# Patient Record
Sex: MALE | NOT HISPANIC OR LATINO | Employment: FULL TIME | URBAN - METROPOLITAN AREA
[De-identification: names, ages, dates, MRNs, and addresses within clinical notes are randomized per-mention and may not be internally consistent; named-entity substitution may affect disease eponyms.]

---

## 2019-11-13 ENCOUNTER — HOSPITAL ENCOUNTER (EMERGENCY)
Facility: CLINIC | Age: 49
Discharge: HOME | End: 2019-11-13
Attending: EMERGENCY MEDICINE | Admitting: EMERGENCY MEDICINE
Payer: COMMERCIAL

## 2019-11-13 ENCOUNTER — APPOINTMENT (OUTPATIENT)
Dept: CT IMAGING | Facility: CLINIC | Age: 49
End: 2019-11-13
Attending: EMERGENCY MEDICINE
Payer: COMMERCIAL

## 2019-11-13 VITALS
RESPIRATION RATE: 18 BRPM | DIASTOLIC BLOOD PRESSURE: 93 MMHG | SYSTOLIC BLOOD PRESSURE: 132 MMHG | WEIGHT: 228 LBS | TEMPERATURE: 98 F | HEART RATE: 56 BPM | OXYGEN SATURATION: 96 %

## 2019-11-13 DIAGNOSIS — R51.9 NONINTRACTABLE HEADACHE, UNSPECIFIED CHRONICITY PATTERN, UNSPECIFIED HEADACHE TYPE: ICD-10-CM

## 2019-11-13 DIAGNOSIS — R03.0 ELEVATED BLOOD PRESSURE READING WITHOUT DIAGNOSIS OF HYPERTENSION: ICD-10-CM

## 2019-11-13 LAB
ANION GAP SERPL CALCULATED.3IONS-SCNC: 5 MMOL/L (ref 3–14)
BUN SERPL-MCNC: 15 MG/DL (ref 7–30)
CALCIUM SERPL-MCNC: 8.7 MG/DL (ref 8.5–10.1)
CHLORIDE SERPL-SCNC: 108 MMOL/L (ref 94–109)
CO2 SERPL-SCNC: 25 MMOL/L (ref 20–32)
CREAT SERPL-MCNC: 0.94 MG/DL (ref 0.66–1.25)
ERYTHROCYTE [DISTWIDTH] IN BLOOD BY AUTOMATED COUNT: 12.7 % (ref 10–15)
GFR SERPL CREATININE-BSD FRML MDRD: >90 ML/MIN/{1.73_M2}
GLUCOSE SERPL-MCNC: 117 MG/DL (ref 70–99)
HCT VFR BLD AUTO: 47.8 % (ref 40–53)
HGB BLD-MCNC: 15.4 G/DL (ref 13.3–17.7)
INTERPRETATION ECG - MUSE: NORMAL
MCH RBC QN AUTO: 30.7 PG (ref 26.5–33)
MCHC RBC AUTO-ENTMCNC: 32.2 G/DL (ref 31.5–36.5)
MCV RBC AUTO: 95 FL (ref 78–100)
PLATELET # BLD AUTO: 258 10E9/L (ref 150–450)
POTASSIUM SERPL-SCNC: 3.7 MMOL/L (ref 3.4–5.3)
RBC # BLD AUTO: 5.02 10E12/L (ref 4.4–5.9)
SODIUM SERPL-SCNC: 138 MMOL/L (ref 133–144)
WBC # BLD AUTO: 10.9 10E9/L (ref 4–11)

## 2019-11-13 PROCEDURE — 70450 CT HEAD/BRAIN W/O DYE: CPT

## 2019-11-13 PROCEDURE — 96375 TX/PRO/DX INJ NEW DRUG ADDON: CPT

## 2019-11-13 PROCEDURE — 96374 THER/PROPH/DIAG INJ IV PUSH: CPT

## 2019-11-13 PROCEDURE — 85027 COMPLETE CBC AUTOMATED: CPT | Performed by: EMERGENCY MEDICINE

## 2019-11-13 PROCEDURE — 25000128 H RX IP 250 OP 636: Performed by: EMERGENCY MEDICINE

## 2019-11-13 PROCEDURE — 96376 TX/PRO/DX INJ SAME DRUG ADON: CPT

## 2019-11-13 PROCEDURE — 80048 BASIC METABOLIC PNL TOTAL CA: CPT | Performed by: EMERGENCY MEDICINE

## 2019-11-13 PROCEDURE — 93005 ELECTROCARDIOGRAM TRACING: CPT

## 2019-11-13 PROCEDURE — 99285 EMERGENCY DEPT VISIT HI MDM: CPT | Mod: 25

## 2019-11-13 RX ORDER — METOCLOPRAMIDE 10 MG/1
10 TABLET ORAL 4 TIMES DAILY PRN
Qty: 15 TABLET | Refills: 0 | Status: SHIPPED | OUTPATIENT
Start: 2019-11-13 | End: 2019-11-23

## 2019-11-13 RX ORDER — DIPHENHYDRAMINE HYDROCHLORIDE 50 MG/ML
25 INJECTION INTRAMUSCULAR; INTRAVENOUS ONCE
Status: COMPLETED | OUTPATIENT
Start: 2019-11-13 | End: 2019-11-13

## 2019-11-13 RX ORDER — METOCLOPRAMIDE HYDROCHLORIDE 5 MG/ML
10 INJECTION INTRAMUSCULAR; INTRAVENOUS ONCE
Status: COMPLETED | OUTPATIENT
Start: 2019-11-13 | End: 2019-11-13

## 2019-11-13 RX ADMIN — PROCHLORPERAZINE EDISYLATE 10 MG: 5 INJECTION, SOLUTION INTRAMUSCULAR; INTRAVENOUS at 05:28

## 2019-11-13 RX ADMIN — DIPHENHYDRAMINE HYDROCHLORIDE 25 MG: 50 INJECTION, SOLUTION INTRAMUSCULAR; INTRAVENOUS at 04:48

## 2019-11-13 RX ADMIN — METOCLOPRAMIDE 10 MG: 5 INJECTION, SOLUTION INTRAMUSCULAR; INTRAVENOUS at 04:48

## 2019-11-13 RX ADMIN — DIPHENHYDRAMINE HYDROCHLORIDE 25 MG: 50 INJECTION, SOLUTION INTRAMUSCULAR; INTRAVENOUS at 05:28

## 2019-11-13 ASSESSMENT — ENCOUNTER SYMPTOMS
NUMBNESS: 0
FEVER: 0

## 2019-11-13 NOTE — ED PROVIDER NOTES
History     Chief Complaint:  Hypertension    The history is provided by the patient. A  was used (daughter provided as needed).      Les Coto is a 49 year old male who presents to the emergency department today for evaluation of hypertension. The patient reports that approximately an hour ago he took his blood pressure and noted it was elevated at 167. Since then he has started to feel ill. He endorses a headache that starts in his back and radiates up to his head. He states that he has been getting these headaches regularly for the past year or so; he has not been evaluated for them yet. The patient reports he took an over the counter pain medication prior to arrival which did not provide relief. He adds that yesterday he got back from a work trip to [place] after driving for nine days. Prior to going to bed last night he ate his normal dinner and notes he took a shot of whiskey to help him sleep. He has had no fevers, numbness, tingling, or any vision changes. The patient denies any drug use, smoking, alcohol use, or regularly having energy drinks or caffeine.     Allergies:  No Known Drug Allergies     Medications:    Medications reviewed. No pertinent medications.     Past Medical History:    Kidney stone, h/o    Past Surgical History:    Surgical history reviewed. No pertinent surgical history.    Family History:    Family history reviewed. No pertinent family history.    Social History:  The patient was accompanied to the ED by his daughter.  Marital Status:     Drug Use: Negative  Alcohol Use: Negative  Smoking Status: Negative     Review of Systems   Constitutional: Negative for fever.   Eyes: Negative for visual disturbance.   Cardiovascular:        Hypertension    Neurological: Negative for numbness.        No tingling   All other systems reviewed and are negative.      Physical Exam     Patient Vitals for the past 24 hrs:   BP Temp Temp src Pulse Heart Rate Resp  SpO2 Weight   11/13/19 0520 (!) 136/98 -- -- 70 -- 18 97 % --   11/13/19 0424 (!) 163/118 98  F (36.7  C) Temporal 66 66 18 99 % 103.4 kg (228 lb)      Physical Exam    GENERAL:  Pleasant, well appearing male  HEENT:   External ears are normal.     Temporal arteries are non-tender.      Oropharynx is moist, without lesions or trismus.  Eyes:   PERRL.  EOMI.      No corneal clouding.   NECK:   Supple, no meningismus.       Negative Brudzinski's sign.  CV:    Regular rate and rhythm.    No murmurs, rubs or gallops.  PULM:   Clear to auscultation bilateral.      No respiratory distress.      No stridor or wheezing.  ABD:  Soft, non-tender, non-distended.      No pulsatile masses.      No rebound or guarding.  MSK:    No gross deformity to all four extremities.      No significant joint effusions.  LYMPH:  No cervical lymphadenopathy.  NEURO:  A & O x 3    CN II-XII intact, speech is clear with no aphasia.      No pronator drift.      Strength is 5/5 in all 4 extremities.  Sensation is intact.      Normal muscular tone, no tremor.  SKIN:   Warm, dry and intact.    PSYCH:   Mood is good and affect is appropriate.      Emergency Department Course     ECG:  ECG taken at 0430, ECG read at 0432  Sinus bradycardia  Minimal voltage criteria for LVH, may be normal variant  Borderline ECG   Rate 59 bpm. MA interval 154. QRS duration 98. QT/QTc 436/431. P-R-T axes 32 -4 14.     Imaging:  Radiology findings were communicated with the patient and family who voiced understanding of the findings.  CT, Head without Contrast  1.  Normal head CT.  Reading per radiology    Laboratory:  Laboratory findings were communicated with the patient and family who voiced understanding of the findings.  CBC: AWNL (WBC 10.9, HGB 15.4, )  BMP: Glucose 117 (H). O/w WNL (Creatinine 0.94)      Interventions:  0448 Diphenhydramine 25 mg IV  0448 Metoclopramide 10 mg IV  0528 Diphenhydramine 25 mg IV  0528 Prochlorperazine 10 mg IV    Emergency  Department Course:  0428 Nursing notes and vitals reviewed.  0430 An ECG was performed, results above.   0431 I performed an exam of the patient as documented above.   0436 IV was inserted and blood was drawn for laboratory testing, results above.   0516 The patient was sent for a CT scan while in the emergency department, results above.    0520 Per RN, the patient's headache has not resolved with Reglan. Plan to try Compazine.  0546 Patient was rechecked and updated with laboratory and imaging results.    Findings and plan explained to the Patient and daughter. Patient discharged home with instructions regarding supportive care, medications, and reasons to return. The importance of close follow-up was reviewed. The patient was prescribed Reglan.     I personally reviewed the laboratory and imaging results with the Patient and daughter and answered all related questions prior to discharge.      Impression & Plan      Medical Decision Makin-year-old male presented to the ED with progressively worsening headache.  This headache has been intermittent for last year and a half with no prior imaging.  Head CT undertaken to ensure there was no intracranial mass. CT normal.  No alternative pathology noted on CT scan.  He has no features concerning for subarachnoid hemorrhage, meningitis, dural sinus thrombosis, temporal arteritis or glaucoma.  Findings are most consistent with mixed type headache with migraine and tension features.  Patient had near complete resolution of headache with interventions as described above.    Patient was also worried about elevated blood pressure.  Blood pressure was elevated while patient presented in pain.  Blood pressure remarkably improved with analgesics alone and no antihypertensives given.  No evidence of acute endorgan damage.  Patient to follow-up with primary care physician for blood pressure recheck.  Safe for discharge home.        Diagnosis:    ICD-10-CM    1.  Nonintractable headache, unspecified chronicity pattern, unspecified headache type R51    2. Elevated blood pressure reading without diagnosis of hypertension R03.0        Disposition:  The patient is discharged to home.     Discharge Medications:  New Prescriptions    METOCLOPRAMIDE (REGLAN) 10 MG TABLET    Take 1 tablet (10 mg) by mouth 4 times daily as needed (headache or nausea)       Scribe Disclosure:  Airam SELF, am serving as a scribe at 4:31 AM on 11/13/2019 to document services personally performed by Miller Crow MD based on my observations and the provider's statements to me.    11/13/2019   [facility] EMERGENCY DEPARTMENT       Miller Crow MD  11/13/19 4363

## 2019-11-13 NOTE — DISCHARGE INSTRUCTIONS
Please make an appointment to follow up with your primary care provider in 5-7 days even if entirely better for blood pressure recheck.     Discharge Instructions  Headache    You were seen today for a headache. Headaches may be caused by many different things such as muscle tension, sinus inflammation, anxiety and stress, having too little sleep, too much alcohol, some medical conditions or injury. You may have a migraine, which is caused by changes in the blood vessels in your head.  At this time your provider does not find that your headache is a sign of anything dangerous or life-threatening.  However, sometimes the signs of serious illness do not show up right away.      Generally, every Emergency Department visit should have a follow-up clinic visit with either a primary or a specialty clinic/provider. Please follow-up as instructed by your emergency provider today.    Return to the Emergency Department if:  You get a new fever of 100.4 F or higher.  Your headache gets much worse.  You get a stiff neck with your headache.  You get a new headache that is significantly different or worse than headaches you have had before.  You are vomiting (throwing up) and cannot keep food or water down.  You have blurry or double vision or other problems with your eyes.  You have a new weakness on one side of your body.  You have difficulty with balance which is new.  You or your family thinks you are confused.  You have a seizure.    What can I do to help myself?  Pain medications - You may take a pain medication such as Tylenol  (acetaminophen), Advil , Motrin  (ibuprofen) or Aleve  (naproxen).  Take a pain reliever as soon as you notice symptoms.  Starting medications as soon as you start to have symptoms may lessen the amount of pain you have.  Relaxing in a quiet, dark room may help.  Get enough sleep and eat meals regularly.  You may need to watch for certain foods or other things which may trigger your headaches.   Keeping a journal of your headaches and possible triggers may help you and your primary provider to identify things which you should avoid which may be causing your headaches.  If you were given a prescription for medicine here today, be sure to read all of the information (including the package insert) that comes with your prescription.  This will include important information about the medicine, its side effects, and any warnings that you need to know about.  The pharmacist who fills the prescription can provide more information and answer questions you may have about the medicine.  If you have questions or concerns that the pharmacist cannot address, please call or return to the Emergency Department.   Remember that you can always come back to the Emergency Department if you are not able to see your regular provider in the amount of time listed above, if you get any new symptoms, or if there is anything that worries you.  Discharge Instructions  Hypertension - High Blood Pressure    During you visit to the Emergency Department, your blood pressure was higher than the recommended blood pressure.  This may be related to stress, pain, medication or other temporary conditions. In these cases, your blood pressure may return to normal on its own. If you have a history of high blood pressure, you may need to have your provider adjust your medications. Sometimes, your high measurement here may indicate that you have developed high blood pressure that will stay high unless it is treated. As a general rule, high blood pressure causes problems over years rather than days, weeks, or months. So, while it is important to treat blood pressure, it is rarely important to treat blood pressure immediately. Occasionally we will begin a medication in the Emergency Department; more often we will recommend close follow-up for medications with a primary doctor/clinic.    Generally, every Emergency Department visit should have a  follow-up clinic visit with either a primary or a specialty clinic/provider. Please follow-up as instructed by your emergency provider today.    Return to the Emergency Department if you start to have:  A severe headache.  Chest pain.  Shortness of breath.  Weakness or numbness that affects one part of the body.  Confusion.  Vision changes.  Significant swelling of legs and/or eyes.  A reaction to any medication started in the Emergency Department.    What can I do to help myself?  Avoid alcohol.  Take any blood pressure medicine that you are prescribed.  Get a good night s sleep.  Lower your salt intake.  Exercise.  Lose weight.  Manage stress.  See your doctor regularly    If blood pressure medication was started in the Emergency Department:  The medicine may not have an immediate effect. The body and brain determine what blood pressure you have. The medicine s job is to retrain the body s  thermostat  to a lower blood pressure.  You will need to follow up with your provider to see how this medicine is working for you.  If you were given a prescription for medicine here today, be sure to read all of the information (including the package insert) that comes with your prescription.  This will include important information about the medicine, its side effects, and any warnings that you need to know about.  The pharmacist who fills the prescription can provide more information and answer questions you may have about the medicine.  If you have questions or concerns that the pharmacist cannot address, please call or return to the Emergency Department.   Remember that you can always come back to the Emergency Department if you are not able to see your regular provider in the amount of time listed above, if you get any new symptoms, or if there is anything that worries you.

## 2019-11-13 NOTE — ED AVS SNAPSHOT
[facility] Emergency Department  [address]  [facility] [address]  Phone:  [phone]  Fax:  [phone]                                    Les Coto   MRN: [id number]    Department:  [facility] Emergency Department   Date of Visit:  11/13/2019           After Visit Summary Signature Page    I have received my discharge instructions, and my questions have been answered. I have discussed any challenges I see with this plan with the nurse or doctor.    ..........................................................................................................................................  Patient/Patient Representative Signature      ..........................................................................................................................................  Patient Representative Print Name and Relationship to Patient    ..................................................               ................................................  Date                                   Time    ..........................................................................................................................................  Reviewed by Signature/Title    ...................................................              ..............................................  Date                                               Time          22EPIC Rev 08/18

## 2019-11-23 ENCOUNTER — HOSPITAL ENCOUNTER (EMERGENCY)
Facility: CLINIC | Age: 49
Discharge: HOME | End: 2019-11-23
Attending: EMERGENCY MEDICINE | Admitting: EMERGENCY MEDICINE
Payer: COMMERCIAL

## 2019-11-23 VITALS
BODY MASS INDEX: 29.68 KG/M2 | RESPIRATION RATE: 20 BRPM | SYSTOLIC BLOOD PRESSURE: 122 MMHG | OXYGEN SATURATION: 96 % | TEMPERATURE: 97.6 F | WEIGHT: 231.26 LBS | HEIGHT: 74 IN | HEART RATE: 64 BPM | DIASTOLIC BLOOD PRESSURE: 75 MMHG

## 2019-11-23 DIAGNOSIS — R51.9 ACUTE NONINTRACTABLE HEADACHE, UNSPECIFIED HEADACHE TYPE: ICD-10-CM

## 2019-11-23 PROCEDURE — 96375 TX/PRO/DX INJ NEW DRUG ADDON: CPT

## 2019-11-23 PROCEDURE — 25000128 H RX IP 250 OP 636: Performed by: EMERGENCY MEDICINE

## 2019-11-23 PROCEDURE — 64405 NJX AA&/STRD GR OCPL NRV: CPT

## 2019-11-23 PROCEDURE — 96365 THER/PROPH/DIAG IV INF INIT: CPT

## 2019-11-23 PROCEDURE — 99284 EMERGENCY DEPT VISIT MOD MDM: CPT | Mod: 25

## 2019-11-23 RX ORDER — METOCLOPRAMIDE HYDROCHLORIDE 5 MG/ML
10 INJECTION INTRAMUSCULAR; INTRAVENOUS ONCE
Status: COMPLETED | OUTPATIENT
Start: 2019-11-23 | End: 2019-11-23

## 2019-11-23 RX ORDER — MAGNESIUM SULFATE 1 G/100ML
1 INJECTION INTRAVENOUS ONCE
Status: COMPLETED | OUTPATIENT
Start: 2019-11-23 | End: 2019-11-23

## 2019-11-23 RX ORDER — DIPHENHYDRAMINE HYDROCHLORIDE 50 MG/ML
12.5 INJECTION INTRAMUSCULAR; INTRAVENOUS ONCE
Status: COMPLETED | OUTPATIENT
Start: 2019-11-23 | End: 2019-11-23

## 2019-11-23 RX ORDER — DEXAMETHASONE SODIUM PHOSPHATE 10 MG/ML
10 INJECTION, SOLUTION INTRAMUSCULAR; INTRAVENOUS ONCE
Status: COMPLETED | OUTPATIENT
Start: 2019-11-23 | End: 2019-11-23

## 2019-11-23 RX ORDER — LIDOCAINE HYDROCHLORIDE AND EPINEPHRINE 10; 10 MG/ML; UG/ML
INJECTION, SOLUTION INFILTRATION; PERINEURAL
Status: DISCONTINUED
Start: 2019-11-23 | End: 2019-11-23 | Stop reason: HOSPADM

## 2019-11-23 RX ORDER — KETOROLAC TROMETHAMINE 15 MG/ML
15 INJECTION, SOLUTION INTRAMUSCULAR; INTRAVENOUS ONCE
Status: COMPLETED | OUTPATIENT
Start: 2019-11-23 | End: 2019-11-23

## 2019-11-23 RX ADMIN — KETOROLAC TROMETHAMINE 15 MG: 15 INJECTION, SOLUTION INTRAMUSCULAR; INTRAVENOUS at 04:47

## 2019-11-23 RX ADMIN — MAGNESIUM SULFATE HEPTAHYDRATE 1 G: 1 INJECTION, SOLUTION INTRAVENOUS at 06:12

## 2019-11-23 RX ADMIN — DEXAMETHASONE SODIUM PHOSPHATE 10 MG: 10 INJECTION, SOLUTION INTRAMUSCULAR; INTRAVENOUS at 04:51

## 2019-11-23 RX ADMIN — METOCLOPRAMIDE 10 MG: 5 INJECTION, SOLUTION INTRAMUSCULAR; INTRAVENOUS at 06:11

## 2019-11-23 RX ADMIN — DIPHENHYDRAMINE HYDROCHLORIDE 12 MG: 50 INJECTION, SOLUTION INTRAMUSCULAR; INTRAVENOUS at 06:08

## 2019-11-23 ASSESSMENT — MIFFLIN-ST. JEOR: SCORE: 1983.75

## 2019-11-23 NOTE — ED AVS SNAPSHOT
[facility] Emergency Department  [address]  [facility] [address]  Phone:  [phone]  Fax:  [phone]                                    Les Coto   MRN: [id number]    Department:  [facility] Emergency Department   Date of Visit:  11/23/2019           After Visit Summary Signature Page    I have received my discharge instructions, and my questions have been answered. I have discussed any challenges I see with this plan with the nurse or doctor.    ..........................................................................................................................................  Patient/Patient Representative Signature      ..........................................................................................................................................  Patient Representative Print Name and Relationship to Patient    ..................................................               ................................................  Date                                   Time    ..........................................................................................................................................  Reviewed by Signature/Title    ...................................................              ..............................................  Date                                               Time          22EPIC Rev 08/18

## 2019-11-23 NOTE — ED TRIAGE NOTES
"Pt has headache which started at 0300.  He had headache last week and scan was done and he received medications.   He is nauseated with \"\"warm\" feeling down abdomen.  "

## 2019-11-23 NOTE — ED PROVIDER NOTES
History     Chief Complaint:  Headache    The history is provided by the patient and a relative.      Les Coto is a 49 year old male, who has been experiencing headaches regularly for the past year, but not yet evaluated for this, who presents with his daughter for evaluation of a headache that began at 0300 this morning. Of note, patient was evaluated for similar symptoms on 11/13 and had a CT scan of the head, as noted below and discharged home feeling improved after interventions at that time. Patient states that he began to experience a headache at 0300 this morning, similar to the headache that prompted his presentation at his last ED visit. Patient took an over-the-counter medication 15 minutes prior to arrival, but due to pain, patient was prompted to present.     Here, patient also endorses that his blood pressure is elevated. He states that his headache begins at the base of his neck, radiating into the right side of his head and across the forehead, which has been consistent with his prior headaches. He denies any fever, recent head injury or trauma.    Imaging from ED Visit 11/13/19:  CT, Head without Contrast  1.  Normal head CT.  Reading per radiology    Allergies:  No Known Drug Allergies     Medications:    The patient is not currently taking any prescribed medications.     Past Medical History:    Kidney stone    Past Surgical History:    History reviewed. No pertinent past surgical history.     Family History:    History reviewed. No pertinent family history.      Social History:  The patient was accompanied to the ED by daughter.  Smoking Status: No  Alcohol Use: No  Drug Use: No   Marital Status:       Review of Systems   All other systems reviewed and are negative.      Physical Exam     Patient Vitals for the past 24 hrs:   BP Temp Temp src Pulse Heart Rate Resp SpO2 Height Weight   11/23/19 0630 122/75 -- -- 64 -- -- -- -- --   11/23/19 0620 (!) 135/90 -- -- -- -- -- -- -- --  "  11/23/19 0610 (!) 134/91 -- -- 65 -- -- -- -- --   11/23/19 0353 (!) 141/106 97.6  F (36.4  C) Temporal -- 71 20 96 % 1.88 m (6' 2\") 104.9 kg (231 lb 4.2 oz)       Physical Exam  Nursing note and vitals reviewed.  Constitutional: Cooperative. Uncomfortable appearing.   HENT: No neck rigidity.   Mouth/Throat: Mucous membranes are normal.   Cardiovascular: Normal rate, regular rhythm and normal heart sounds.  No murmur.  Pulmonary/Chest: Effort normal and breath sounds normal. No respiratory distress. No wheezes. No rales.   Abdominal: Soft. Normal appearance. There is no tenderness.  Neurological: Alert. Oriented x4.  Stregnth normal.   Skin: Skin is warm and dry. No rash noted.   Psychiatric: Normal mood and affect.      Emergency Department Course     Procedures:    Procedure: Right Occipital nerve block  Indication: Headache  Performed by: Dr. South Arreaga MD  Consent: Patient gave verbal consent after risks of intravascular injection, cellulitis and treatment failure was explained to the patient.  Technique: The location was cleansed using alcohol swabs.  Landmarks identified with occipital foramen identified by palpation.  A 50:50 mix of 0.5% bupivacaine with epinephrine and 1% Lidocaine with epinephrine, total 8 ml's, was injected.  The area was then massaged.  Complications: none    Interventions:  0447 Toradol 15 mg IV  0451 Decadron 10 mg IV  0608 Benadryl 12.5 mg IV  0611 Reglan 10 mg IV  0612 Magnesium 1 gm IV    Emergency Department Course:  Past medical records, nursing notes, and vitals reviewed.    (7294)   I performed an exam of the patient as documented above. History obtained from patient.    (0500)   Performed the above occipital nerve block, as noted in procedures.     (2237)   Updated by RN that patient is sleeping soundly.     (6556)   Updated by RN that patient woke up and is endorsing a lot of pain.     (2557)   Rechecked patient. Will order additional interventions.     (9639)   I rechecked " the patient. He endorses resolution of his headache. Discussed plan of care and patient will be discharged.     Findings and plan explained to the Patient and daughter. Patient discharged home with instructions regarding supportive care, medications, and reasons to return. The importance of close follow-up was reviewed. I personally answered all related questions prior to discharge.     Impression & Plan   Medical Decision Making:  Mr. Coto is a 49 year old gentleman with several visits recently for headache. He has known cervical spine disease. Head scan reviewed and was negative from several says ago. After the above interventions, he is pain free. No findings concerning for meninginitis or infectious cause. There's been no trauma. This is not a story concerning for dural venous thrombosis or subarachnoid hemorrhage. I suspect combination migrainous pain with possible tension headache or occipital lobe inflammation. He has excellent follow up scheduled with his family doc and neurology on Monday and Tuesday. Return precautions discussed.     Discharge Diagnosis:    ICD-10-CM    1. Acute nonintractable headache, unspecified headache type R51        Disposition:  Discharged to home.    Scribe Disclosure:  Lorin SELF, am serving as a scribe at 4:35 AM on 11/23/2019 to document services personally performed by South Arreaga MD based on my observations and the provider's statements to me.   11/23/2019   [facility] EMERGENCY DEPARTMENT       South Arreaga MD  11/23/19 1346

## 2020-07-27 ENCOUNTER — APPOINTMENT (OUTPATIENT)
Dept: ULTRASOUND IMAGING | Facility: CLINIC | Age: 50
End: 2020-07-27
Attending: EMERGENCY MEDICINE
Payer: COMMERCIAL

## 2020-07-27 ENCOUNTER — HOSPITAL ENCOUNTER (EMERGENCY)
Facility: CLINIC | Age: 50
Discharge: HOME | End: 2020-07-27
Attending: EMERGENCY MEDICINE | Admitting: EMERGENCY MEDICINE
Payer: COMMERCIAL

## 2020-07-27 VITALS
SYSTOLIC BLOOD PRESSURE: 141 MMHG | OXYGEN SATURATION: 98 % | RESPIRATION RATE: 16 BRPM | HEART RATE: 82 BPM | TEMPERATURE: 97.9 F | DIASTOLIC BLOOD PRESSURE: 86 MMHG

## 2020-07-27 DIAGNOSIS — Z20.822 SUSPECTED COVID-19 VIRUS INFECTION: ICD-10-CM

## 2020-07-27 DIAGNOSIS — M54.42 ACUTE BILATERAL LOW BACK PAIN WITH LEFT-SIDED SCIATICA: ICD-10-CM

## 2020-07-27 DIAGNOSIS — M79.89 LEFT LEG SWELLING: ICD-10-CM

## 2020-07-27 LAB
ALBUMIN UR-MCNC: NEGATIVE MG/DL
ANION GAP SERPL CALCULATED.3IONS-SCNC: 6 MMOL/L (ref 3–14)
APPEARANCE UR: CLEAR
BILIRUB UR QL STRIP: NEGATIVE
BUN SERPL-MCNC: 11 MG/DL (ref 7–30)
CALCIUM SERPL-MCNC: 8.6 MG/DL (ref 8.5–10.1)
CHLORIDE SERPL-SCNC: 104 MMOL/L (ref 94–109)
CO2 SERPL-SCNC: 27 MMOL/L (ref 20–32)
COLOR UR AUTO: YELLOW
CREAT SERPL-MCNC: 1.05 MG/DL (ref 0.66–1.25)
GFR SERPL CREATININE-BSD FRML MDRD: 82 ML/MIN/{1.73_M2}
GLUCOSE SERPL-MCNC: 105 MG/DL (ref 70–99)
GLUCOSE UR STRIP-MCNC: NEGATIVE MG/DL
HGB UR QL STRIP: NEGATIVE
KETONES UR STRIP-MCNC: NEGATIVE MG/DL
LEUKOCYTE ESTERASE UR QL STRIP: NEGATIVE
MUCOUS THREADS #/AREA URNS LPF: PRESENT /LPF
NITRATE UR QL: NEGATIVE
PH UR STRIP: 5.5 PH (ref 5–7)
POTASSIUM SERPL-SCNC: 4.1 MMOL/L (ref 3.4–5.3)
RBC #/AREA URNS AUTO: <1 /HPF (ref 0–2)
SODIUM SERPL-SCNC: 137 MMOL/L (ref 133–144)
SOURCE: ABNORMAL
SP GR UR STRIP: 1.02 (ref 1–1.03)
UROBILINOGEN UR STRIP-MCNC: NORMAL MG/DL (ref 0–2)
WBC #/AREA URNS AUTO: 1 /HPF (ref 0–5)

## 2020-07-27 PROCEDURE — 93971 EXTREMITY STUDY: CPT | Mod: LT

## 2020-07-27 PROCEDURE — 81001 URINALYSIS AUTO W/SCOPE: CPT | Performed by: EMERGENCY MEDICINE

## 2020-07-27 PROCEDURE — C9803 HOPD COVID-19 SPEC COLLECT: HCPCS

## 2020-07-27 PROCEDURE — 99285 EMERGENCY DEPT VISIT HI MDM: CPT | Mod: 25

## 2020-07-27 PROCEDURE — 80048 BASIC METABOLIC PNL TOTAL CA: CPT | Performed by: EMERGENCY MEDICINE

## 2020-07-27 PROCEDURE — U0003 INFECTIOUS AGENT DETECTION BY NUCLEIC ACID (DNA OR RNA); SEVERE ACUTE RESPIRATORY SYNDROME CORONAVIRUS 2 (SARS-COV-2) (CORONAVIRUS DISEASE [COVID-19]), AMPLIFIED PROBE TECHNIQUE, MAKING USE OF HIGH THROUGHPUT TECHNOLOGIES AS DESCRIBED BY CMS-2020-01-R: HCPCS | Performed by: EMERGENCY MEDICINE

## 2020-07-27 ASSESSMENT — ENCOUNTER SYMPTOMS
ROS GI COMMENTS: + BLACK STOOL
COUGH: 1
FEVER: 1
DYSURIA: 0
BACK PAIN: 1
SHORTNESS OF BREATH: 0
HEMATURIA: 0

## 2020-07-27 NOTE — ED PROVIDER NOTES
History   Chief Complaint:  Fever; Wants COVID19 test; and Flank Pain    HPI   Les Coto is a 50 year old male with history of nephrolithiasis, H. Pylori infection, chronic diarrhea, hypertension, and hyperlipidemia who presents for evaluation of left-sided back pain, associated with a subjective fever, and left leg swelling, that worsened 4 days ago. The patient states for the past 3 months he has had intermittent left-sided back pain, which radiates down his left leg. He also endorses left leg swelling. About 4 days ago he began having a subjective feel at night, so he opened the window to help cool him off. Over the past four days, he has also had increased left leg swelling, noting the swelling alleviates at night and increases throughout the day. This morning he woke up feeling hot again, so he increased the AC prior to calling his primary care and presenting to urgent care, who subsequently prompted his presentation.    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 He also endorses a mild cough as well as black stool. He denies any shortness of breath, dysuria, hematuria, or other symptoms prompting his presentation.     Laboratory Results 7/27/2020  CBC: AWNL (WBC 6.0, HGB 16.1, )     Allergies:  No Known Drug Allergies     Medications:   Inderal  Norvasc  Prilosec     Past Medical History:    Nephrolithiasis   GERD  H. Pylori infection  Chronic diarrhea   Hypertension  Hyperlipidemia   Anxiety     Past Surgical History:    Abdomen surgery, for volvulus    Appendectomy    Family History:    The patient denies any relevant family medical history.     Social History:  The patient was unaccompanied to the ED.  Smoking Status: Never Smoker  Smokeless Tobacco: Never Used  Alcohol Use: Yes  Drug Use: No  PCP: [facility], [place]     Review of Systems   Constitutional: Positive for fever (Subjective).   Respiratory:  Positive for cough. Negative for shortness of breath.    Cardiovascular: Positive for leg swelling (left).   Gastrointestinal:        + Black stool   Genitourinary: Negative for dysuria and hematuria.   Musculoskeletal: Positive for back pain.   All other systems reviewed and are negative.      Physical Exam     Patient Vitals for the past 24 hrs:   BP Temp Temp src Pulse Heart Rate Resp SpO2   07/27/20 1437 (!) 141/86 -- -- -- 88 16 96 %   07/27/20 1228 (!) 150/94 97.9  F (36.6  C) Oral 82 -- -- 99 %     Physical Exam  Constitutional: Alert, attentive  HENT:    Nose: Nose normal.    Mouth/Throat: Oropharynx is clear, mucous membranes are moist  Eyes: EOM are normal.    CV: brisk capillary refill to the distal extremities, 2+ DP pulses bilaterally.  Chest: Effort normal and breath sounds normal.   GI: No distension. There is no tenderness to the RUQ, RLQ or LLQ. No Junior's sign or McBurney's point tenderness. No palpable, pulsatile mass.  MSK: Normal range of motion. No midline T/L/S spine tenderness. No LE edema  Neurological: Alert, attentive.  GCS 15; 5/5 strength throughout the bilateral lower extremities (hip flexion/extension, knee flexion/extension, DF/PF, EHL/FHL); sensation intact to light touch throughout L2-S1 distributions to the lower extremities; 2+ DTRs to the bilateral lower extremities (patellar, achilles); normal gait  Skin: Skin is warm and dry.     Emergency Department Course   Imaging:  Radiology findings were communicated with the patient who voiced understanding of the findings.    US Lower Extremity Venous Duplex Left  IMPRESSION:   No DVT demonstrated.  Reading per radiology.      Laboratory:  Laboratory findings were communicated with the patient who voiced understanding of the findings.    BMP: Glucose 105 (H) o/w WNL (Creatinine 1.05)     UA with Microscopic: Mucous Present (A) o/w WNL     COVID-19 Virus (Coronavirus) by PCR: Pending.      Emergency Department Course:  Past medical  records, nursing notes, and vitals reviewed.  The patient was sent for a US Lower Extremity Venous Duplex Left while in the emergency department, results above.    IV was inserted and blood was drawn for laboratory testing, results above.   The patient provided a urine sample here in the emergency department. This was sent for laboratory testing, findings above.   A nasal swab was obtained for laboratory testing, findings above.      (1255)   I performed an exam of the patient as documented above. History obtained from patient.     (1500)   I rechecked the patient and discussed results and plan of care.     Findings and plan explained to the Patient. Patient discharged home with instructions regarding supportive care, medications, and reasons to return. The importance of close follow-up was reviewed. I personally reviewed the laboratory and imaging results with the Patient and answered all related questions prior to discharge.     Impression & Plan   Covid-19  Les Coto was evaluated during a global COVID-19 pandemic, which necessitated consideration that the patient might be at risk for infection with the SARS-CoV-2 virus that causes COVID-19.   Applicable protocols for evaluation were followed during the patient's care.   COVID-19 was considered as part of the patient's evaluation. The plan for testing is:  a test was obtained during this visit.     Medical Decision Making:  This is a 50-year-old male who presents for evaluation of several concerns:  1.  Subjective fevers and chills: Symptoms are overall concerning for viral syndrome, possibly COVID-19.  He has no known contacts but has symptoms consistent with potential diagnosis of the same.  COVID-19 testing is pending.  No cardiopulmonary symptoms to suggest ACS, PE, other more concerning etiology.  Plan home isolation until results are available.  2.  Left leg swelling: Patient notes this is subacute, proximally lasting 3 months, waxing and waning while  driving.  Specifically, symptoms resolved after he is done driving and elevates the foot.  There is no erythema or warmth to suggest cellulitis, and DVT ultrasound is negative.  Recommended outpatient follow-up and repeat ultrasound if symptoms persist after 1 week.  3.  Low back pain: The patient indicates history of kidney stones and several months of low back pain as well, sometimes rating to the left leg.  UA is unremarkable for evidence of hematuria suggest nephrolithiasis and the patient has no significant pain at this time.  No evidence of cauda equina.  Normal exam otherwise.  Plan continue supportive cares with standing.  Patient be discharged with strict return precautions for worse pain, weakness, or any other concerns.    Diagnosis:    ICD-10-CM    1. Suspected COVID-19 virus infection  Z20.828    2. Left leg swelling  M79.89    3. Acute bilateral low back pain with left-sided sciatica  M54.42      Disposition:  Discharged to home.     Scribe Disclosure:  I, Naren Benjamin, am serving as a scribe at 12:39 PM on 7/27/2020 to document services personally performed by South Escudero MD based on my observations and the provider's statements to me.  July 27, 2020   [facility] EMERGENCY DEPARTMENT        South Escudero MD  07/27/20 7376

## 2020-07-27 NOTE — ED AVS SNAPSHOT
[facility] Emergency Department  [address]  [facility] [address]  Phone:  [phone]  Fax:  [phone]                                    Les Coto   MRN: [id number]    Department:  [facility] Emergency Department   Date of Visit:  7/27/2020           After Visit Summary Signature Page    I have received my discharge instructions, and my questions have been answered. I have discussed any challenges I see with this plan with the nurse or doctor.    ..........................................................................................................................................  Patient/Patient Representative Signature      ..........................................................................................................................................  Patient Representative Print Name and Relationship to Patient    ..................................................               ................................................  Date                                   Time    ..........................................................................................................................................  Reviewed by Signature/Title    ...................................................              ..............................................  Date                                               Time          22EPIC Rev 08/18

## 2020-07-27 NOTE — ED TRIAGE NOTES
Patient presents with left flank pain going down left leg.  Left leg swelling. Issue started 3 months ago.  Patient reports fever x 4 days. Has not taken anything for fever. ABCs intact.     Requesting COVID19 test

## 2020-07-28 LAB
SARS-COV-2 RNA SPEC QL NAA+PROBE: ABNORMAL
SPECIMEN SOURCE: ABNORMAL

## 2020-07-29 ENCOUNTER — HOSPITAL ENCOUNTER (EMERGENCY)
Facility: CLINIC | Age: 50
Discharge: HOME | End: 2020-07-29
Attending: EMERGENCY MEDICINE | Admitting: EMERGENCY MEDICINE
Payer: COMMERCIAL

## 2020-07-29 ENCOUNTER — APPOINTMENT (OUTPATIENT)
Dept: CT IMAGING | Facility: CLINIC | Age: 50
End: 2020-07-29
Attending: EMERGENCY MEDICINE
Payer: COMMERCIAL

## 2020-07-29 VITALS
DIASTOLIC BLOOD PRESSURE: 97 MMHG | HEART RATE: 70 BPM | TEMPERATURE: 98.3 F | OXYGEN SATURATION: 92 % | RESPIRATION RATE: 22 BRPM | SYSTOLIC BLOOD PRESSURE: 133 MMHG

## 2020-07-29 DIAGNOSIS — R51.9 NONINTRACTABLE HEADACHE, UNSPECIFIED CHRONICITY PATTERN, UNSPECIFIED HEADACHE TYPE: ICD-10-CM

## 2020-07-29 DIAGNOSIS — U07.1 2019 NOVEL CORONAVIRUS DISEASE (COVID-19): ICD-10-CM

## 2020-07-29 LAB
ANION GAP SERPL CALCULATED.3IONS-SCNC: 8 MMOL/L (ref 3–14)
BASOPHILS # BLD AUTO: 0 10E9/L (ref 0–0.2)
BASOPHILS NFR BLD AUTO: 0.3 %
BUN SERPL-MCNC: 16 MG/DL (ref 7–30)
CALCIUM SERPL-MCNC: 8.6 MG/DL (ref 8.5–10.1)
CHLORIDE SERPL-SCNC: 106 MMOL/L (ref 94–109)
CO2 SERPL-SCNC: 22 MMOL/L (ref 20–32)
CREAT SERPL-MCNC: 0.98 MG/DL (ref 0.66–1.25)
DIFFERENTIAL METHOD BLD: NORMAL
EOSINOPHIL # BLD AUTO: 0 10E9/L (ref 0–0.7)
EOSINOPHIL NFR BLD AUTO: 0.2 %
ERYTHROCYTE [DISTWIDTH] IN BLOOD BY AUTOMATED COUNT: 13 % (ref 10–15)
GFR SERPL CREATININE-BSD FRML MDRD: 89 ML/MIN/{1.73_M2}
GLUCOSE SERPL-MCNC: 110 MG/DL (ref 70–99)
HCT VFR BLD AUTO: 49.8 % (ref 40–53)
HGB BLD-MCNC: 16 G/DL (ref 13.3–17.7)
IMM GRANULOCYTES # BLD: 0 10E9/L (ref 0–0.4)
IMM GRANULOCYTES NFR BLD: 0.3 %
LYMPHOCYTES # BLD AUTO: 1.7 10E9/L (ref 0.8–5.3)
LYMPHOCYTES NFR BLD AUTO: 29.1 %
MCH RBC QN AUTO: 29.6 PG (ref 26.5–33)
MCHC RBC AUTO-ENTMCNC: 32.1 G/DL (ref 31.5–36.5)
MCV RBC AUTO: 92 FL (ref 78–100)
MONOCYTES # BLD AUTO: 0.6 10E9/L (ref 0–1.3)
MONOCYTES NFR BLD AUTO: 9.5 %
NEUTROPHILS # BLD AUTO: 3.5 10E9/L (ref 1.6–8.3)
NEUTROPHILS NFR BLD AUTO: 60.6 %
NRBC # BLD AUTO: 0 10*3/UL
NRBC BLD AUTO-RTO: 0 /100
PLATELET # BLD AUTO: 154 10E9/L (ref 150–450)
POTASSIUM SERPL-SCNC: 3.8 MMOL/L (ref 3.4–5.3)
RBC # BLD AUTO: 5.41 10E12/L (ref 4.4–5.9)
SODIUM SERPL-SCNC: 136 MMOL/L (ref 133–144)
WBC # BLD AUTO: 5.8 10E9/L (ref 4–11)

## 2020-07-29 PROCEDURE — 99284 EMERGENCY DEPT VISIT MOD MDM: CPT | Mod: 25

## 2020-07-29 PROCEDURE — 96374 THER/PROPH/DIAG INJ IV PUSH: CPT

## 2020-07-29 PROCEDURE — 96375 TX/PRO/DX INJ NEW DRUG ADDON: CPT

## 2020-07-29 PROCEDURE — 96361 HYDRATE IV INFUSION ADD-ON: CPT

## 2020-07-29 PROCEDURE — 96376 TX/PRO/DX INJ SAME DRUG ADON: CPT

## 2020-07-29 PROCEDURE — 85025 COMPLETE CBC W/AUTO DIFF WBC: CPT | Performed by: EMERGENCY MEDICINE

## 2020-07-29 PROCEDURE — 25800030 ZZH RX IP 258 OP 636: Performed by: EMERGENCY MEDICINE

## 2020-07-29 PROCEDURE — 25000128 H RX IP 250 OP 636: Performed by: EMERGENCY MEDICINE

## 2020-07-29 PROCEDURE — 80048 BASIC METABOLIC PNL TOTAL CA: CPT | Performed by: EMERGENCY MEDICINE

## 2020-07-29 PROCEDURE — 70450 CT HEAD/BRAIN W/O DYE: CPT

## 2020-07-29 RX ORDER — KETOROLAC TROMETHAMINE 15 MG/ML
15 INJECTION, SOLUTION INTRAMUSCULAR; INTRAVENOUS ONCE
Status: COMPLETED | OUTPATIENT
Start: 2020-07-29 | End: 2020-07-29

## 2020-07-29 RX ORDER — SODIUM CHLORIDE 9 MG/ML
INJECTION, SOLUTION INTRAVENOUS CONTINUOUS
Status: DISCONTINUED | OUTPATIENT
Start: 2020-07-29 | End: 2020-07-29 | Stop reason: HOSPADM

## 2020-07-29 RX ORDER — DIPHENHYDRAMINE HYDROCHLORIDE 50 MG/ML
25 INJECTION INTRAMUSCULAR; INTRAVENOUS ONCE
Status: COMPLETED | OUTPATIENT
Start: 2020-07-29 | End: 2020-07-29

## 2020-07-29 RX ADMIN — PROCHLORPERAZINE EDISYLATE 5 MG: 5 INJECTION INTRAMUSCULAR; INTRAVENOUS at 12:57

## 2020-07-29 RX ADMIN — PROCHLORPERAZINE EDISYLATE 5 MG: 5 INJECTION INTRAMUSCULAR; INTRAVENOUS at 11:10

## 2020-07-29 RX ADMIN — KETOROLAC TROMETHAMINE 15 MG: 15 INJECTION, SOLUTION INTRAMUSCULAR; INTRAVENOUS at 10:29

## 2020-07-29 RX ADMIN — DIPHENHYDRAMINE HYDROCHLORIDE 25 MG: 50 INJECTION, SOLUTION INTRAMUSCULAR; INTRAVENOUS at 11:10

## 2020-07-29 RX ADMIN — SODIUM CHLORIDE 1000 ML: 9 INJECTION, SOLUTION INTRAVENOUS at 10:28

## 2020-07-29 ASSESSMENT — ENCOUNTER SYMPTOMS
HEADACHES: 1
FEVER: 1

## 2020-07-29 NOTE — ED NOTES
Pt understands that compazine and benadryl may make him drowsy and verbalized that he could obtain a ride home.

## 2020-07-29 NOTE — ED TRIAGE NOTES
Patient presents with cough, headache specifically that got worse. Patient tested positive for COVID yesterday, seen on 7/27. ABCDs intact, alert and oriented x 4.

## 2020-07-29 NOTE — ED PROVIDER NOTES
History     Chief Complaint:  Headache and Cough    HPI  Les Coto is a 50 year old male with a history of HTN and KAMILLE who presents for evaluation of fever, headache and cough.  Patient has been sick for about 4 days.  He has had high fevers.  He is a  and returned recently.  He was tested for COVID and was told it was positive.  He reports a headache since yesterday.  He is taking Tylenol with no improvement of his headache.  He is eating and drinking.  Denies any nausea or vomiting.  Reports a cough and shortness of breath.  He reports he coughs and his headache gets much worse.  Is otherwise healthy.      Allergies:  No known drug allergies    Medications:    Zantac  Naproxen  Prilosec  Amlodipine  Propranolol    Past Medical History:    Kidney stone  HTN  Hypertriglyceridemia  Plantar fasciitis  Fatty liver  H. Pylori infection  KAMILLE  Gastrointestinal ulcer  Gallstones  GERD    Past Surgical History:    Appendectomy  Abdomen surgery    Family History:    History reviewed. No pertinent family history.     Social History:  The patient arrives alone  Smoking: never  Alcohol use: yes  PCP: [facility], [place]  Marital Status: Single [1]      Review of Systems   Constitutional: Positive for fever.   Neurological: Positive for headaches.   All other systems reviewed and are negative.      Physical Exam     Patient Vitals for the past 24 hrs:   BP Temp Temp src Pulse Heart Rate Resp SpO2   07/29/20 1245 (!) 158/89 -- -- 74 -- -- 93 %   07/29/20 1230 (!) 158/111 -- -- 76 -- -- 92 %   07/29/20 1215 (!) 163/102 -- -- 80 -- -- 94 %   07/29/20 1200 (!) 144/103 -- -- 65 -- -- 95 %   07/29/20 1145 (!) 146/100 -- -- 71 -- -- 95 %   07/29/20 1115 (!) 138/103 -- -- 81 -- -- 96 %   07/29/20 1100 (!) 142/96 -- -- -- -- -- 97 %   07/29/20 0917 138/87 98.3  F (36.8  C) Oral -- 106 22 100 %     Physical Exam  General: Patient is alert and interactive when I enter the room  Head:  The scalp, face, and head  appear normal  Eyes:  Conjunctivae are normal  ENT:    The nose is normal    Pinnae are normal    External acoustic canals are normal  Neck:  Trachea midline, good ROM, no rigidity   CV:  Pulses are normal,RRR.    Resp:  No respiratory distress, CTAB  Abdomen:      Soft, non-tender, non-distended  Musc:  Normal muscular tone    No major joint effusions    No asymmetric leg swelling  Skin:  No rash or lesions noted  Neuro:  Speech is normal and fluent. Face is symmetric.     Moving all extremities well.   Psych: Awake. Alert.  Normal affect.  Appropriate interactions.      Emergency Department Course     Imaging:  Radiology findings were communicated with the patient who voiced understanding of the findings.    CT Head wo contrast   IMPRESSION: No acute pathology, no bleed, mass, or areas of acute   infarction. No change  Reading per radiology    Laboratory:  Laboratory findings were communicated with the patient who voiced understanding of the findings.    CBC: (WBC 5.8, HGB 16.0, )   BMP: Glucose 110 (H), o/w WNL (Creatinine: 0.98)    Interventions:  1028 NS 1L IV Bolus  1029 Toradol, 15 mg, IV  1110 Compazine 10 mg IV  1110 Benadryl 25 mg IV    Emergency Department Course:  Past medical records, nursing notes, and vitals reviewed.  0933: I performed an exam of the patient and obtained history, as documented above.     IV was inserted and blood was drawn for laboratory testing, results above.    The patient was sent for a CT while in the emergency department, findings above    I personally reviewed the laboratory and imaging results with the Patient and answered all related questions prior to discharge.     Findings and plan explained to the Patient. Patient discharged home with instructions regarding supportive care, medications, and reasons to return. The importance of close follow-up was reviewed.   Impression & Plan      Medical Decision Making:  Les Coto is a 50 year old male who presents to the  emergency department today for evaluation of headache and cough.  Patient was recently diagnosed with COVID.  He he is continued to have fevers headache and cough.  Patient was given Compazine Toradol and fluids and felt much improved.  We did do a head CT which was unremarkable.  The rest of his blood work was unremarkable.  Patient otherwise looked well and I think he is just dealing with symptoms from COVID.  He is not hypoxic so I do not think he needs to be admitted.  He otherwise appears well with no respiratory distress so I think he can continue to treat this at home.  Return precautions however given.        Diagnosis:    ICD-10-CM   1. 2019 novel coronavirus disease (COVID-19)  U07.1   2. Nonintractable headache, unspecified chronicity pattern, unspecified headache type  R51       Disposition:   Discharged to home.      Scribe Disclosure:  I, Reva Bradford, am serving as a scribe at 9:33 AM on 7/29/2020 to document services personally performed by Elvi Smart MD based on my observations and the provider's statements to me.    [facility] EMERGENCY DEPARTMENT     Elvi Smart MD  07/29/20 2251

## 2020-07-29 NOTE — ED AVS SNAPSHOT
[facility] Emergency Department  [address]  [facility] [address]  Phone:  [phone]  Fax:  [phone]                                    Les Coto   MRN: [id number]    Department:  [facility] Emergency Department   Date of Visit:  7/29/2020           After Visit Summary Signature Page    I have received my discharge instructions, and my questions have been answered. I have discussed any challenges I see with this plan with the nurse or doctor.    ..........................................................................................................................................  Patient/Patient Representative Signature      ..........................................................................................................................................  Patient Representative Print Name and Relationship to Patient    ..................................................               ................................................  Date                                   Time    ..........................................................................................................................................  Reviewed by Signature/Title    ...................................................              ..............................................  Date                                               Time          22EPIC Rev 08/18

## 2020-12-13 ENCOUNTER — HEALTH MAINTENANCE LETTER (OUTPATIENT)
Age: 50
End: 2020-12-13

## 2021-08-10 ENCOUNTER — HOSPITAL ENCOUNTER (EMERGENCY)
Facility: CLINIC | Age: 51
Discharge: HOME | End: 2021-08-10
Attending: EMERGENCY MEDICINE | Admitting: EMERGENCY MEDICINE
Payer: COMMERCIAL

## 2021-08-10 VITALS
HEART RATE: 56 BPM | HEIGHT: 72 IN | TEMPERATURE: 97.4 F | WEIGHT: 225 LBS | BODY MASS INDEX: 30.48 KG/M2 | DIASTOLIC BLOOD PRESSURE: 94 MMHG | OXYGEN SATURATION: 93 % | RESPIRATION RATE: 18 BRPM | SYSTOLIC BLOOD PRESSURE: 126 MMHG

## 2021-08-10 DIAGNOSIS — M54.50 ACUTE EXACERBATION OF CHRONIC LOW BACK PAIN: ICD-10-CM

## 2021-08-10 DIAGNOSIS — G89.29 ACUTE EXACERBATION OF CHRONIC LOW BACK PAIN: ICD-10-CM

## 2021-08-10 PROCEDURE — 96375 TX/PRO/DX INJ NEW DRUG ADDON: CPT

## 2021-08-10 PROCEDURE — 96374 THER/PROPH/DIAG INJ IV PUSH: CPT

## 2021-08-10 PROCEDURE — 99285 EMERGENCY DEPT VISIT HI MDM: CPT | Mod: 25

## 2021-08-10 PROCEDURE — 250N000011 HC RX IP 250 OP 636: Performed by: EMERGENCY MEDICINE

## 2021-08-10 RX ORDER — DIAZEPAM 10 MG/2ML
5 INJECTION, SOLUTION INTRAMUSCULAR; INTRAVENOUS ONCE
Status: COMPLETED | OUTPATIENT
Start: 2021-08-10 | End: 2021-08-10

## 2021-08-10 RX ORDER — HYDROMORPHONE HYDROCHLORIDE 1 MG/ML
0.5 INJECTION, SOLUTION INTRAMUSCULAR; INTRAVENOUS; SUBCUTANEOUS
Status: DISCONTINUED | OUTPATIENT
Start: 2021-08-10 | End: 2021-08-10 | Stop reason: HOSPADM

## 2021-08-10 RX ORDER — KETOROLAC TROMETHAMINE 15 MG/ML
15 INJECTION, SOLUTION INTRAMUSCULAR; INTRAVENOUS ONCE
Status: COMPLETED | OUTPATIENT
Start: 2021-08-10 | End: 2021-08-10

## 2021-08-10 RX ADMIN — HYDROMORPHONE HYDROCHLORIDE 0.5 MG: 1 INJECTION, SOLUTION INTRAMUSCULAR; INTRAVENOUS; SUBCUTANEOUS at 17:50

## 2021-08-10 RX ADMIN — DIAZEPAM 5 MG: 5 INJECTION, SOLUTION INTRAMUSCULAR; INTRAVENOUS at 17:43

## 2021-08-10 RX ADMIN — KETOROLAC TROMETHAMINE 15 MG: 15 INJECTION, SOLUTION INTRAMUSCULAR; INTRAVENOUS at 17:38

## 2021-08-10 ASSESSMENT — ENCOUNTER SYMPTOMS
NUMBNESS: 0
BACK PAIN: 1
WEAKNESS: 0

## 2021-08-10 ASSESSMENT — MIFFLIN-ST. JEOR: SCORE: 1913.59

## 2021-08-10 NOTE — ED TRIAGE NOTES
Pt presents for concern of back pain. Pt states it started x1 week ago after receiving a vaccine and then starting his  route. States he was seen at an ED else were and given pain medications then. States that his back has continued to be painful and he states he is now having a difficulty time walking. Denies bowel or bladder control issues. States hx of herniated disks. ABC intact, A&Ox4.
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
## 2021-08-10 NOTE — ED NOTES
Pt. States 0/10 on pain scale. Pt. Able to move around in bed without pain. Pt. Want to try and walk.

## 2021-08-11 ASSESSMENT — ENCOUNTER SYMPTOMS: DIFFICULTY URINATING: 0

## 2021-09-26 ENCOUNTER — HEALTH MAINTENANCE LETTER (OUTPATIENT)
Age: 51
End: 2021-09-26

## 2022-01-16 ENCOUNTER — HEALTH MAINTENANCE LETTER (OUTPATIENT)
Age: 52
End: 2022-01-16

## 2023-01-14 ENCOUNTER — HEALTH MAINTENANCE LETTER (OUTPATIENT)
Age: 53
End: 2023-01-14

## 2023-04-23 ENCOUNTER — HEALTH MAINTENANCE LETTER (OUTPATIENT)
Age: 53
End: 2023-04-23

## 2024-06-30 ENCOUNTER — HEALTH MAINTENANCE LETTER (OUTPATIENT)
Age: 54
End: 2024-06-30

## 2025-05-05 ENCOUNTER — APPOINTMENT (OUTPATIENT)
Dept: MRI IMAGING | Facility: CLINIC | Age: 55
End: 2025-05-05
Attending: EMERGENCY MEDICINE
Payer: COMMERCIAL

## 2025-05-05 ENCOUNTER — HOSPITAL ENCOUNTER (EMERGENCY)
Facility: CLINIC | Age: 55
Discharge: HOME | End: 2025-05-06
Attending: EMERGENCY MEDICINE | Admitting: EMERGENCY MEDICINE
Payer: COMMERCIAL

## 2025-05-05 DIAGNOSIS — M48.061 FORAMINAL STENOSIS OF LUMBAR REGION: ICD-10-CM

## 2025-05-05 DIAGNOSIS — M48.061 SPINAL STENOSIS AT L4-L5 LEVEL: ICD-10-CM

## 2025-05-05 DIAGNOSIS — M54.16 LUMBAR BACK PAIN WITH RADICULOPATHY AFFECTING LEFT LOWER EXTREMITY: ICD-10-CM

## 2025-05-05 DIAGNOSIS — T81.49XA POSTOPERATIVE WOUND ABSCESS: ICD-10-CM

## 2025-05-05 LAB
ANION GAP SERPL CALCULATED.3IONS-SCNC: 10 MMOL/L (ref 7–15)
BASOPHILS # BLD AUTO: 0 10E3/UL (ref 0–0.2)
BASOPHILS NFR BLD AUTO: 0 %
BUN SERPL-MCNC: 12.7 MG/DL (ref 6–20)
CALCIUM SERPL-MCNC: 9.5 MG/DL (ref 8.8–10.4)
CHLORIDE SERPL-SCNC: 104 MMOL/L (ref 98–107)
CREAT SERPL-MCNC: 1.05 MG/DL (ref 0.67–1.17)
CRP SERPL-MCNC: 26.07 MG/L
EGFRCR SERPLBLD CKD-EPI 2021: 84 ML/MIN/1.73M2
EOSINOPHIL # BLD AUTO: 0.4 10E3/UL (ref 0–0.7)
EOSINOPHIL NFR BLD AUTO: 4 %
ERYTHROCYTE [DISTWIDTH] IN BLOOD BY AUTOMATED COUNT: 12.4 % (ref 10–15)
ERYTHROCYTE [SEDIMENTATION RATE] IN BLOOD BY WESTERGREN METHOD: 21 MM/HR (ref 0–20)
GLUCOSE SERPL-MCNC: 104 MG/DL (ref 70–99)
HCO3 SERPL-SCNC: 23 MMOL/L (ref 22–29)
HCT VFR BLD AUTO: 42.7 % (ref 40–53)
HGB BLD-MCNC: 13.8 G/DL (ref 13.3–17.7)
IMM GRANULOCYTES # BLD: 0 10E3/UL
IMM GRANULOCYTES NFR BLD: 0 %
LYMPHOCYTES # BLD AUTO: 3.3 10E3/UL (ref 0.8–5.3)
LYMPHOCYTES NFR BLD AUTO: 35 %
MCH RBC QN AUTO: 29.7 PG (ref 26.5–33)
MCHC RBC AUTO-ENTMCNC: 32.3 G/DL (ref 31.5–36.5)
MCV RBC AUTO: 92 FL (ref 78–100)
MONOCYTES # BLD AUTO: 1.1 10E3/UL (ref 0–1.3)
MONOCYTES NFR BLD AUTO: 12 %
NEUTROPHILS # BLD AUTO: 4.6 10E3/UL (ref 1.6–8.3)
NEUTROPHILS NFR BLD AUTO: 49 %
NRBC # BLD AUTO: 0 10E3/UL
NRBC BLD AUTO-RTO: 0 /100
PLATELET # BLD AUTO: 221 10E3/UL (ref 150–450)
POTASSIUM SERPL-SCNC: 4.8 MMOL/L (ref 3.4–5.3)
RBC # BLD AUTO: 4.64 10E6/UL (ref 4.4–5.9)
SODIUM SERPL-SCNC: 137 MMOL/L (ref 135–145)
WBC # BLD AUTO: 9.5 10E3/UL (ref 4–11)

## 2025-05-05 PROCEDURE — 72158 MRI LUMBAR SPINE W/O & W/DYE: CPT

## 2025-05-05 PROCEDURE — 250N000011 HC RX IP 250 OP 636: Mod: JZ | Performed by: EMERGENCY MEDICINE

## 2025-05-05 PROCEDURE — 36415 COLL VENOUS BLD VENIPUNCTURE: CPT | Performed by: EMERGENCY MEDICINE

## 2025-05-05 PROCEDURE — 99285 EMERGENCY DEPT VISIT HI MDM: CPT | Mod: 25

## 2025-05-05 PROCEDURE — 85652 RBC SED RATE AUTOMATED: CPT | Performed by: EMERGENCY MEDICINE

## 2025-05-05 PROCEDURE — 86140 C-REACTIVE PROTEIN: CPT | Performed by: EMERGENCY MEDICINE

## 2025-05-05 PROCEDURE — 96376 TX/PRO/DX INJ SAME DRUG ADON: CPT | Mod: 59

## 2025-05-05 PROCEDURE — A9585 GADOBUTROL INJECTION: HCPCS | Performed by: EMERGENCY MEDICINE

## 2025-05-05 PROCEDURE — 96374 THER/PROPH/DIAG INJ IV PUSH: CPT | Mod: 59

## 2025-05-05 PROCEDURE — 85025 COMPLETE CBC W/AUTO DIFF WBC: CPT | Performed by: EMERGENCY MEDICINE

## 2025-05-05 PROCEDURE — 250N000013 HC RX MED GY IP 250 OP 250 PS 637: Performed by: EMERGENCY MEDICINE

## 2025-05-05 PROCEDURE — 255N000002 HC RX 255 OP 636: Performed by: EMERGENCY MEDICINE

## 2025-05-05 PROCEDURE — 80048 BASIC METABOLIC PNL TOTAL CA: CPT | Performed by: EMERGENCY MEDICINE

## 2025-05-05 RX ORDER — GADOBUTROL 604.72 MG/ML
10 INJECTION INTRAVENOUS ONCE
Status: COMPLETED | OUTPATIENT
Start: 2025-05-05 | End: 2025-05-05

## 2025-05-05 RX ORDER — CYCLOBENZAPRINE HCL 10 MG
10 TABLET ORAL ONCE
Status: COMPLETED | OUTPATIENT
Start: 2025-05-05 | End: 2025-05-05

## 2025-05-05 RX ADMIN — CYCLOBENZAPRINE 10 MG: 10 TABLET, FILM COATED ORAL at 23:39

## 2025-05-05 RX ADMIN — HYDROMORPHONE HYDROCHLORIDE 1 MG: 1 INJECTION, SOLUTION INTRAMUSCULAR; INTRAVENOUS; SUBCUTANEOUS at 21:37

## 2025-05-05 RX ADMIN — HYDROMORPHONE HYDROCHLORIDE 1 MG: 1 INJECTION, SOLUTION INTRAMUSCULAR; INTRAVENOUS; SUBCUTANEOUS at 23:40

## 2025-05-05 RX ADMIN — GADOBUTROL 10 ML: 604.72 INJECTION INTRAVENOUS at 22:34

## 2025-05-05 ASSESSMENT — COLUMBIA-SUICIDE SEVERITY RATING SCALE - C-SSRS
2. HAVE YOU ACTUALLY HAD ANY THOUGHTS OF KILLING YOURSELF IN THE PAST MONTH?: NO
6. HAVE YOU EVER DONE ANYTHING, STARTED TO DO ANYTHING, OR PREPARED TO DO ANYTHING TO END YOUR LIFE?: NO
1. IN THE PAST MONTH, HAVE YOU WISHED YOU WERE DEAD OR WISHED YOU COULD GO TO SLEEP AND NOT WAKE UP?: NO

## 2025-05-05 ASSESSMENT — ACTIVITIES OF DAILY LIVING (ADL)
ADLS_ACUITY_SCORE: 41

## 2025-05-06 VITALS
DIASTOLIC BLOOD PRESSURE: 97 MMHG | TEMPERATURE: 98.9 F | OXYGEN SATURATION: 98 % | SYSTOLIC BLOOD PRESSURE: 130 MMHG | HEART RATE: 62 BPM | RESPIRATION RATE: 18 BRPM

## 2025-05-06 PROCEDURE — 96376 TX/PRO/DX INJ SAME DRUG ADON: CPT

## 2025-05-06 PROCEDURE — 250N000011 HC RX IP 250 OP 636: Mod: JZ | Performed by: EMERGENCY MEDICINE

## 2025-05-06 RX ORDER — HYDROMORPHONE HYDROCHLORIDE 1 MG/ML
0.5 INJECTION, SOLUTION INTRAMUSCULAR; INTRAVENOUS; SUBCUTANEOUS ONCE
Status: COMPLETED | OUTPATIENT
Start: 2025-05-06 | End: 2025-05-06

## 2025-05-06 RX ORDER — CYCLOBENZAPRINE HCL 10 MG
10 TABLET ORAL 3 TIMES DAILY PRN
Qty: 15 TABLET | Refills: 0 | Status: SHIPPED | OUTPATIENT
Start: 2025-05-06

## 2025-05-06 RX ORDER — POLYETHYLENE GLYCOL 3350 17 G/17G
1 POWDER, FOR SOLUTION ORAL DAILY PRN
Qty: 527 G | Refills: 0 | Status: SHIPPED | OUTPATIENT
Start: 2025-05-06

## 2025-05-06 RX ORDER — MUPIROCIN 20 MG/G
OINTMENT TOPICAL 2 TIMES DAILY
Qty: 22 G | Refills: 0 | Status: SHIPPED | OUTPATIENT
Start: 2025-05-06 | End: 2025-05-13

## 2025-05-06 RX ORDER — SULFAMETHOXAZOLE AND TRIMETHOPRIM 800; 160 MG/1; MG/1
TABLET ORAL
Qty: 14 TABLET | Refills: 0 | Status: SHIPPED | OUTPATIENT
Start: 2025-05-06

## 2025-05-06 RX ADMIN — HYDROMORPHONE HYDROCHLORIDE 0.5 MG: 1 INJECTION, SOLUTION INTRAMUSCULAR; INTRAVENOUS; SUBCUTANEOUS at 00:38

## 2025-05-06 ASSESSMENT — ACTIVITIES OF DAILY LIVING (ADL): ADLS_ACUITY_SCORE: 41

## 2025-05-06 NOTE — ED PROVIDER NOTES
Emergency Department Note      History of Present Illness     Chief Complaint   Post-op Problem and Back Pain    HPI   Les Coto is a 55 year old male with history of lumbar stenosis, lumbar radiculopathy, and MRSA of surgical site who presents to the ED for evaluation of a post-operative problem and back pain. On 04/08, Les had spinal surgery, he had increased pain after the first surgery. He went to the ED on 04/13 when he was admitted for pain radiating down his left leg and a sensation of pressure at his lower back. Les was diagnosed with MRSA of the site on 04/25, the infection is currently being managed with bactrim.  4/25 was also the day he finished his prednisone taper.  Prior to the surgery on 04/08, Les did not have pain radiating down his left leg. Les notes that his back pain is 25 times worse than it was prior to the surgery. He denies substantial leg weakness.  No groin numbness.  No leg numbness.     Independent Historian   None    Review of External Notes   Surgical note 4/8:  PROCEDURES PERFORMED:  1. Bilateral L5-S1 native, L5 inferior, S1 superior hemilaminotomy and bilateral L5-S1 medial facetectomy, and subarticular recess decompression of the traversing S1 nerve roots.  2. Revision decompression, revision bilateral L4-5 hemilaminotomy, medial facetectomy and subarticular recess decompression with partial diskectomy revision with further decompression of the traversing 5th roots.  3. Bilateral L5 neurolysis.     Recent MRI from 4/13:  1. At L4-5, posterior decompression with laminectomy bed fluid collection extending towards the left lateral recess. Residual disc protrusion combined with laminectomy bed fluid results in severe spinal canal narrowing and compression upon the cauda equina nerve roots.   2. At L5-S1, posterior decompression with enhancing granulation tissue. Similar mild spinal canal narrowing.     Discharge note from 4/13-4/17 admission: no procedural intervention,  pain medications adjusted, kept on steroids, slowly improving    Past Medical History     Medical History and Problem List   Fatty liver   KAMILLE   GERD   Gallstones   Gastrointestinal ulcer due to Helicobacter pylori   Hypertriglyceridemia   Hypertension   Kidney stone   Lumbar stenosis   Lumbar radiculopathy   MRSA infection  Plantar fasciitis   Type 2 diabetes   Ureteral stone     Medications   Bactrim   Cozaar   Crestor   Deltasone   Neurontin   Norvasc   Omeprazole   Robaxin   Roxicodone   Senokot     Surgical History   Appendectomy   Cystoscopy   Hernia repair   IR lumbar puncture x4   Spinal surgery     Physical Exam     Patient Vitals for the past 24 hrs:   BP Temp Temp src Pulse Resp SpO2   05/05/25 2106 (!) 151/94 -- -- 81 -- 99 %   05/05/25 2055 (!) 154/103 98.9  F (37.2  C) Temporal 94 18 95 %     Physical Exam  Resp:               Non-labored  Neuro:             Alert and cooperative, perineal sensation intact, Foot and ankle strength 5/5 and symmetric, foot sensation intact and symmetric, SLR normal on right leg, any active movement to lift LLE off of bed causes radicular pain in L leg  MSkel:             Moving all extremities  Skin:                Dressing adherent with scant drainage, incision w/areas of superficial opening, unable to express any discharge from area, see picture.  Erythema to the left of the incision improved after a few minutes, this may have been secondary to the pressure I was applying to the area while I was using small amounts of water to loosen the adherent dressing.        Diagnostics     Lab Results   Labs Ordered and Resulted from Time of ED Arrival to Time of ED Departure   ERYTHROCYTE SEDIMENTATION RATE AUTO - Abnormal       Result Value    Erythrocyte Sedimentation Rate 21 (*)    CRP INFLAMMATION - Abnormal    CRP Inflammation 26.07 (*)    BASIC METABOLIC PANEL - Abnormal    Sodium 137      Potassium 4.8      Chloride 104      Carbon Dioxide (CO2) 23      Anion Gap 10       Urea Nitrogen 12.7      Creatinine 1.05      GFR Estimate 84      Calcium 9.5      Glucose 104 (*)    CBC WITH PLATELETS AND DIFFERENTIAL    WBC Count 9.5      RBC Count 4.64      Hemoglobin 13.8      Hematocrit 42.7      MCV 92      MCH 29.7      MCHC 32.3      RDW 12.4      Platelet Count 221      % Neutrophils 49      % Lymphocytes 35      % Monocytes 12      % Eosinophils 4      % Basophils 0      % Immature Granulocytes 0      NRBCs per 100 WBC 0      Absolute Neutrophils 4.6      Absolute Lymphocytes 3.3      Absolute Monocytes 1.1      Absolute Eosinophils 0.4      Absolute Basophils 0.0      Absolute Immature Granulocytes 0.0      Absolute NRBCs 0.0         Imaging   Lumbar spine MRI w & w/o contrast - surgery <10yrs   Final Result   IMPRESSION:   1.  Status post L4-5 and L5-S1 lumbar decompression.   2.  Small postoperative collection in the laminectomy bed at L4-5, without extension into the spinal canal, measures 1.5 cm in maximum dimension, previously 2.5 cm.   3.  No epidural abscess.   4.  At L4-5, severe central spinal stenosis, severe left foraminal stenosis.              Independent Interpretation   None    ED Course      Medications Administered   Medications   HYDROmorphone (DILAUDID) injection 1 mg (has no administration in time range)     Procedures   Procedures     Discussion of Management   None    ED Course   ED Course as of 05/05/25 2128   Mon May 05, 2025   2106 I obtained history and examined the patient as noted above.      Additional Documentation  None    Medical Decision Making / Diagnosis     CMS Diagnoses: None    MIPS   CT/MRI of the lumbar spine was obtained because of risk factors for infection (back pain not responsive to conservative management).    OG Coto is a 55 year old male presenting for still uncontrolled pain with a recent MRI showing concerns for cauda equina.  This would not explain his radicular symptoms.  He also has a postoperative skin infection and  given that he had a prior fluid collection I am not sure how deep this tracks or if he may have resulting discitis.  MRI was obtained with contrast.  Fortunately there is no evidence for discitis or deep surgical site infection.  Previously seen fluid collection is getting smaller in size.  I believe he would benefit from an extension of the Bactrim that he is currently taking.  He has continued to be on outpatient Dilaudid and gabapentin.  He was showing improvement with pain medications here.  Overall the impingement on the cauda equina nerve roots is no longer seen.  The foraminal stenosis is a new read compared to the most recent MRI but is part of what had been treated at surgery so I suspect that this is now recurrent.  He will need close follow-up back with the spine clinic.  I recommended he follow-up with his current surgical clinic.  He was also interested in a second opinion and referral was placed.  After reviewing his recent notes and his results from today, I do not think that admission is warranted.  He was engaged in discharge planning including options for [facility] versus a [facility] pharmacy and confirming the plan for follow-up.  He should also start physical therapy which he has not yet since the surgery since he said when he tried to schedule he was told that because of the surgery he should not do it.    Disposition   The patient was discharged.     Diagnosis     ICD-10-CM    1. Postoperative wound abscess  T81.49XA       2. Spinal stenosis at L4-L5 level  M48.061 Spine  Referral      3. Foraminal stenosis of lumbar region  M48.061 Spine  Referral      4. Lumbar back pain with radiculopathy affecting left lower extremity  M54.16 Spine  Referral           Discharge Medications   New Prescriptions    CYCLOBENZAPRINE (FLEXERIL) 10 MG TABLET    Take 1 tablet (10 mg) by mouth 3 times daily as needed (muscle spasm; pain).    MUPIROCIN (BACTROBAN) 2 % EXTERNAL OINTMENT     Apply topically 2 times daily for 7 days.    POLYETHYLENE GLYCOL (MIRALAX) 17 GM/DOSE POWDER    Take 17 g (1 Capful) by mouth daily as needed for constipation.    SULFAMETHOXAZOLE-TRIMETHOPRIM (BACTRIM DS) 800-160 MG TABLET    Take one tablet twice a day after finishing your current prescription.     I, Brittany Woodruff, am serving as a scribe at 9:06 PM on 5/5/2025 to document services personally performed by Annelise Tripathi MD based on my observations and the provider's statements to me.        Annelise Tripathi MD  05/06/25 0053

## 2025-05-06 NOTE — ED TRIAGE NOTES
Presents to triage with c/o post op problem and increased pain. Patient had spinal surgery mid April. After initial discharge he was admitted to the hospital for cellulitis of the incision site. Since discharge home patient has had increasing pain that is shooting down the L leg. Denies any numbness or tingling or any loss of control of bowel or bladder. He also states that his incision has purulent drainage coming from it.

## 2025-05-07 NOTE — TELEPHONE ENCOUNTER
Action 5/7/25 MV   Action Taken Imaging rquest faxed to [facility]    Etelvina Zhanger on 5/8/2025 at 10:00 AM Imaging resolved into PACS. -JA       SPINE PATIENTS - NEW PROTOCOL PREVISIT    RECORDS RECEIVED FROM: internal   REASON FOR VISIT: Lumbar spine surgery 4/8/25 with outside provider, L4-5 S-1, Still having pain and discomfort, wants a 2nd opinion    PROVIDER: Dr. Alarcon   DATE OF APPT: 5/9/25   NOTES (FOR ALL VISITS) STATUS DETAILS   OFFICE NOTE from referring provider Internal SEE INPATIENT NOTES   OFFICE NOTE from other specialist Care Everywhere Dr Francisco Mckeon @ [facility]:  4/30/25  2/5/25  12/15/21    Dr Haroon Tinsley @ [facility]:  4/29/25    Belle FAJARDO @ [facility]:  2/3/25  12/3/24  5/2/22    Dr Sameer Ledbetter @ [facility]:  8/2/22    Lynnette Reis NP @ [facility]:  3/10/22  3/7/22  11/16/21  8/16/21   DISCHARGE SUMMARY from hospital Internal [facility]:  5/5/25-5/6/25    [facility]:  4/13/25-4/17/25   DISCHARGE REPORT from ER Care Everywhere [facility]:  8/11/21    [facility]:  8/10/21   OPERATIVE REPORT Care Everywhere [facility]:  4/8/25  DECOMPRESSION Revision Level: L4-S1 Side: Bilateral     [facility]:  3/24/22  DECOMPRESSION - HEMILAMINOTOMY/DISCECTOMY LEVELS: L4 TO L5 SIDE: BILATERAL    MEDICATION LIST Internal    IMAGING  (FOR ALL VISITS)     MRI (HEAD, NECK, SPINE) PACS [facility]:  MRI Lumbar Spine 5/5/25    [facility]:  MRI Lumbar Spine 4/13/25    [facility]:  MRI Lumbar Spine 12/3/24  MRI Lumbar Spine 3/7/22  MRI Lumbar Spine 8/12/21  MRI Cervical Spine 10/10/19  MRI Lumbar Spine 4/11/19   XRAY (SPINE) *NEUROSURGERY* PACS [facility]:  XR Spine 4/8/25  XR Spine 3/24/22    [facility]:  XR Lumbar Spine 2/3/25

## 2025-05-08 ENCOUNTER — TELEPHONE (OUTPATIENT)
Dept: NEUROSURGERY | Facility: CLINIC | Age: 55
End: 2025-05-08
Payer: COMMERCIAL

## 2025-05-08 ENCOUNTER — TELEPHONE (OUTPATIENT)
Dept: NEUROSURGERY | Facility: CLINIC | Age: 55
End: 2025-05-08

## 2025-05-08 NOTE — TELEPHONE ENCOUNTER
Anesthesia Evaluation     Patient summary reviewed and Nursing notes reviewed   no history of anesthetic complications:   NPO Solid Status: > 8 hours  NPO Liquid Status: > 2 hours           Airway   Mallampati: II  TM distance: >3 FB  Neck ROM: full  No difficulty expected  Dental      Pulmonary - normal exam    breath sounds clear to auscultation  (+) ,sleep apnea on CPAP  Cardiovascular - normal exam  Exercise tolerance: good (4-7 METS)    ECG reviewed  Rhythm: regular  Rate: normal    (+) hypertension (benicar, did not take this morning), hyperlipidemia    ROS comment: EKG: SR 72 bpm      Neuro/Psych  (+) psychiatric history Anxiety  GI/Hepatic/Renal/Endo    (+) obesity (BMI 33), GERD (protonix) well controlled, thyroid problem (synthroid) hypothyroidism    Musculoskeletal     (+) back pain  Abdominal   (+) obese   Substance History - negative use     OB/GYN negative ob/gyn ROS         Other   arthritis,     ROS/Med Hx Other: PAT Nursing Notes unavailable.                 Anesthesia Plan    ASA 2     general     (Patient understands anesthesia not responsible for dental damage.)  intravenous induction     Anesthetic plan, risks, benefits, and alternatives have been provided, discussed and informed consent has been obtained with: patient.  Pre-procedure education provided  Use of blood products discussed with patient .    Plan discussed with CRNA.      CODE STATUS:          Called patient and cancelled appointment w/ Dr. Alarcon per Catalina Mcdermott via task. -Catalina Islas, KELTON Turpin, RN; P [facility]surgery [facility] Scheduling; Alexa Erwin!    Scheduling - Please cancel appointment with Dr. Alarcon tomorrow, this was not approved or appropriate to be seen by her.    Alexa BANGURA, not sure how this was able to be put on Dorian's schedule.          Previous Messages       ----- Message -----  From: KELTON Mcdermott RN  Sent: 5/8/2025   8:39 AM CDT  To: Catalina Mcdermott RN    This is a patient that has been added to Dorian's clinic tomorrow. I don't see the referral was accepted by you, they were in the ED this week, and looks like they had a decompression revision L4-S1 bilaterally on 4/8/25 by Dr. Mckeon and are being see at [facility].    Should this patient be on Dorian's clinic schedule tomorrow?

## 2025-05-08 NOTE — TELEPHONE ENCOUNTER
Called patient and cancelled the appointment w/ Christina Johnson per KELTON Mcdermott via task. -KB

## 2025-05-09 ENCOUNTER — PRE VISIT (OUTPATIENT)
Dept: NEUROSURGERY | Facility: CLINIC | Age: 55
End: 2025-05-09

## 2025-05-11 ENCOUNTER — HEALTH MAINTENANCE LETTER (OUTPATIENT)
Age: 55
End: 2025-05-11